# Patient Record
(demographics unavailable — no encounter records)

---

## 2025-01-13 NOTE — REASON FOR VISIT
[Follow-Up: _____] : a [unfilled] follow-up visit [FreeTextEntry1] : Pt is here for his 4 month follow up visit.

## 2025-01-13 NOTE — ASSESSMENT
[FreeTextEntry1] : This is a 66-year-old male who follows up today for 2 reasons 1 in regards to his C4-5 anterior cervical discectomy and fusion on September 2024 in which she did excellent from.  He has x-rays today which show well-placed instrumentation with slowly forming fusion.  He has resolved neck pain but still has some residual pain down his arm as well as weakness in his hand.  He feels that he is somewhat improved since surgery.  Plan regards her cervical spine is to reimage his neck in 6 months.  In regards to his lumbar spine he was seen previously and diagnosed with severe lumbar stenosis.  He is still suffering from severe low back pain and pain that radiates in bilateral legs feels numbness in his feet.  Pain is worse with standing or long peers of walking.  They have not resolved with epidural injections done by his pain management doctor at [facility] [place].  His last images are greater than 6 months old and he requires a new MRI of the lumbar spine in order to adequately complete neurosurgical consultation and consideration for surgery for his low back.  I will have the images completed and have the patient follow-up with Dr. Conley for final neurosurgical consultation he understands and agrees to plan will follow-up accordingly

## 2025-03-19 NOTE — REASON FOR VISIT
[Follow-Up: _____] : a [unfilled] follow-up visit [FreeTextEntry1] : Pt is here for his spine education class.

## 2025-03-19 NOTE — ASSESSMENT
[FreeTextEntry1] : Patient presents today to discuss their upcoming surgery.  Patient is scheduled for a L3-5 decompression and fusion 3/26/2025. Today we have reviewed the spine surgery patient manual which includes but not limited to discussing preparing for surgery, recovering from surgery, clearances, and what to expect in the postoperative setting.  Medications were updated and reviewed.  Patient expressed understanding of the above and all of their questions were answered.  We will proceed with scheduling of the surgery.  This was approximately a 15-minute visit.

## 2025-04-30 NOTE — REASON FOR VISIT
[de-identified] :  L3-5 decompression and fusion [de-identified] : 3/26/25 [de-identified] : 9 [de-identified] : 1 [de-identified] : pt is here nine days post op

## 2025-04-30 NOTE — HISTORY OF PRESENT ILLNESS
[FreeTextEntry1] : patient presents today for follow up after L3-5 decompression and fusion 3/26/2025___.  patient reports improving pre-operative symptoms no new nt, weakness, balance issues, or bladder issues tolerating pain   wound well healing motor intact sensory intact  walking with a cane  xrays of the lumbar spine ap lateral views done in the office today limited to spinal interpretation only show L3-5 decompression and fusion with excellent decompression and fusion  follow up 12 weeks xrays lumbar spine